# Patient Record
Sex: FEMALE | Race: WHITE | NOT HISPANIC OR LATINO | Employment: FULL TIME | URBAN - METROPOLITAN AREA
[De-identification: names, ages, dates, MRNs, and addresses within clinical notes are randomized per-mention and may not be internally consistent; named-entity substitution may affect disease eponyms.]

---

## 2021-10-11 ENCOUNTER — OFFICE VISIT (OUTPATIENT)
Dept: URGENT CARE | Facility: CLINIC | Age: 21
End: 2021-10-11

## 2021-10-11 VITALS
BODY MASS INDEX: 25.61 KG/M2 | SYSTOLIC BLOOD PRESSURE: 104 MMHG | TEMPERATURE: 98 F | HEART RATE: 78 BPM | WEIGHT: 150 LBS | OXYGEN SATURATION: 99 % | DIASTOLIC BLOOD PRESSURE: 72 MMHG | RESPIRATION RATE: 16 BRPM | HEIGHT: 64 IN

## 2021-10-11 DIAGNOSIS — Z41.3: Primary | ICD-10-CM

## 2021-10-11 PROCEDURE — 99214 PR OFFICE/OUTPT VISIT, EST, LEVL IV, 30-39 MIN: ICD-10-PCS | Mod: TIER,S$GLB,, | Performed by: EMERGENCY MEDICINE

## 2021-10-11 PROCEDURE — 99214 OFFICE O/P EST MOD 30 MIN: CPT | Mod: TIER,S$GLB,, | Performed by: EMERGENCY MEDICINE

## 2021-10-11 RX ORDER — FLUCONAZOLE 150 MG/1
150 TABLET ORAL DAILY
Qty: 2 TABLET | Refills: 0 | Status: SHIPPED | OUTPATIENT
Start: 2021-10-11 | End: 2021-10-13

## 2021-10-11 RX ORDER — CEPHALEXIN 500 MG/1
500 CAPSULE ORAL EVERY 8 HOURS
Qty: 21 CAPSULE | Refills: 0 | Status: SHIPPED | OUTPATIENT
Start: 2021-10-11 | End: 2021-10-18

## 2021-10-14 ENCOUNTER — TELEPHONE (OUTPATIENT)
Dept: URGENT CARE | Facility: CLINIC | Age: 21
End: 2021-10-14

## 2024-01-11 ENCOUNTER — OFFICE VISIT (OUTPATIENT)
Dept: URGENT CARE | Facility: CLINIC | Age: 24
End: 2024-01-11
Payer: COMMERCIAL

## 2024-01-11 VITALS
BODY MASS INDEX: 25.61 KG/M2 | WEIGHT: 150 LBS | RESPIRATION RATE: 16 BRPM | HEIGHT: 64 IN | HEART RATE: 69 BPM | TEMPERATURE: 98 F | SYSTOLIC BLOOD PRESSURE: 100 MMHG | OXYGEN SATURATION: 98 % | DIASTOLIC BLOOD PRESSURE: 70 MMHG

## 2024-01-11 DIAGNOSIS — J06.9 VIRAL UPPER RESPIRATORY INFECTION: Primary | ICD-10-CM

## 2024-01-11 DIAGNOSIS — R05.9 COUGH, UNSPECIFIED TYPE: ICD-10-CM

## 2024-01-11 DIAGNOSIS — R09.81 NASAL CONGESTION: ICD-10-CM

## 2024-01-11 LAB
CTP QC/QA: YES
CTP QC/QA: YES
POC MOLECULAR INFLUENZA A AGN: NEGATIVE
POC MOLECULAR INFLUENZA B AGN: NEGATIVE
SARS-COV-2 AG RESP QL IA.RAPID: NEGATIVE

## 2024-01-11 PROCEDURE — 99213 OFFICE O/P EST LOW 20 MIN: CPT | Mod: S$GLB,,,

## 2024-01-11 PROCEDURE — 87502 INFLUENZA DNA AMP PROBE: CPT | Mod: QW,S$GLB,,

## 2024-01-11 PROCEDURE — 87811 SARS-COV-2 COVID19 W/OPTIC: CPT | Mod: QW,S$GLB,,

## 2024-01-11 NOTE — PATIENT INSTRUCTIONS
PLEASE READ YOUR DISCHARGE INSTRUCTIONS ENTIRELY AS IT CONTAINS IMPORTANT INFORMATION.    Please drink plenty of fluids.    Please get plenty of rest.    Please return here or go to the Emergency Department for any concerns or worsening of condition.    Please take an over the counter antihistamine medication (Allegra/Claritin/Zyrtec) of your choice as directed for allergy symptoms and/or runny nose and postnasal drip.    Try an over the counter decongestant for sinus pressure/ear pressure, congestion symptoms like Mucinex D or Sudafed or Phenylephrine. You buy this behind the pharmacy counter.    If you do have Hypertension or palpitations, it is safe to take Coricidin HBP for relief of sinus symptoms.    Tylenol or ibuprofen can also be used as directed for pain and fever unless you have an allergy to them or medical condition such as stomach ulcers, kidney or liver disease or blood thinners etc for which you should not be taking these type of medications.     Sore throat recommendations: Warm fluids, warm salt water gargles, throat lozenges, tea, honey, soup, rest, hydration.    Use over the counter Flonase or Nasocort: one spray each nostril twice daily OR two sprays each nostril once daily until nares dry out, unless you have Glaucoma.   Can also supplement with nasal saline rinse.    Sinus rinses DO NOT USE TAP WATER, if you must, water must be at a rolling boil for 1 minute, let it cool, then use.  May use distilled water, or over the counter nasal saline rinses.  Heriberto's vapor rub in shower to help open nasal passages.  May use nasal gel to keep passages moisturized.  May use nasal saline sprays during the day for added relief of congestion.   For those who go to the gym, please do not use the sauna or steam room now to clear sinuses.    Cough     Rest and fluids are important  Can use honey with deon to soothe your throat    Robitussin or Delsyum for cough suppressant for dry cough.    Mucinex DM or  products containing Guaifenesin or Dextromethorphan for expectorant (wet cough).    Take prescription cough meds (pills) as prescribed; take prescription cough syrup at night as needed for cough.  Do not take both the prescribed cough pills and syrup at the same time or within 6 hours of each other.  Do not take the cough syrup with any other sedative medication as it can can cause drowsiness. Do not operate any heavy machinery, drink or drive while taking the cough syrup.     Please follow up with your primary care doctor or specialist in the next 48-72hrs as needed and if no improvement    If you smoke, please stop smoking.    Please return or see your primary care doctor if you develop new or worsening symptoms.     Lastly, good hand washing and cough hygiene (cough into your elbow) will help prevent the spread of the illness. A general rule is that you are no longer contagious once you have been without a fever for over 24 hours without requiring fever reducing medications.     Please arrange follow up with your primary medical clinic as soon as possible. You must understand that you've received an Urgent Care treatment only and that you may be released before all of your medical problems are known or treated. You, the patient, will arrange for follow up as instructed. If your symptoms worsen or fail to improve you should go to the Emergency Room.

## 2024-01-11 NOTE — LETTER
January 11, 2024      [facility] [address] [place] [address]  Phone: [phone]  Fax: [phone]       Patient: Tonja Valentin   YOB: 2000  Date of Visit: 01/11/2024    To Whom It May Concern:    Radha Valentin  was at [facility] on 01/11/2024. The patient may return to work/school on 01/15/2024 with no restrictions. If you have any questions or concerns, or if I can be of further assistance, please do not hesitate to contact me.    Sincerely,    Caitlin Weinberg PA-C

## 2024-01-11 NOTE — PROGRESS NOTES
"Subjective:      Patient ID: Tonja Valentin is a 23 y.o. female.    Vitals:  height is 5' 3.5" (1.613 m) and weight is 68 kg (150 lb). Her temperature is 98.2 °F (36.8 °C). Her blood pressure is 100/70 and her pulse is 69. Her respiration is 16 and oxygen saturation is 98%.     Chief Complaint: Fatigue    Tonja Valentin is a 23 y.o. female who presents for URI sxs which onset yesterday. Associated sxs include cough, congestion, fatigue, HA, generalized myalgias, and sore throat. Patient denies any fever, chills, SOB, CP, abd pain, n/v/d, rash, dizziness, or numbness/tingling. Prior Tx includes tylenol and dayquil. (+) flu exposure.    Other  This is a new problem. The current episode started yesterday. The problem occurs constantly. The problem has been gradually worsening. Associated symptoms include congestion (nasal congestion), coughing (dry cough), fatigue, headaches, myalgias and a sore throat (scratchy). Pertinent negatives include no abdominal pain, change in bowel habit, chest pain, chills, diaphoresis, fever, nausea, numbness or vomiting. Associated symptoms comments: Runny nose. Nothing aggravates the symptoms. She has tried acetaminophen (tylenol, Dayquil) for the symptoms. The treatment provided mild relief.       Constitution: Positive for fatigue. Negative for appetite change, chills, sweating and fever.   HENT:  Positive for congestion (nasal congestion) and sore throat (scratchy). Negative for ear pain, ear discharge, hearing loss, postnasal drip, sinus pain, sinus pressure and trouble swallowing.    Cardiovascular:  Negative for chest pain.   Respiratory:  Positive for cough (dry cough). Negative for sputum production and shortness of breath.    Gastrointestinal:  Negative for abdominal pain, nausea, vomiting and diarrhea.   Musculoskeletal:  Positive for muscle ache.   Neurological:  Positive for headaches. Negative for dizziness, numbness and tingling.      Objective:     Physical Exam "   Constitutional: She is oriented to person, place, and time. She appears well-developed. She is cooperative.  Non-toxic appearance. She does not appear ill. No distress.   HENT:   Head: Normocephalic and atraumatic.   Ears:   Right Ear: Hearing, tympanic membrane, external ear and ear canal normal.   Left Ear: Hearing, tympanic membrane, external ear and ear canal normal.   Nose: Nose normal. No mucosal edema or nasal deformity. No epistaxis. Right sinus exhibits no maxillary sinus tenderness and no frontal sinus tenderness. Left sinus exhibits no maxillary sinus tenderness and no frontal sinus tenderness.   Mouth/Throat: Uvula is midline, oropharynx is clear and moist and mucous membranes are normal. Mucous membranes are moist. No trismus in the jaw. Normal dentition. No uvula swelling. No oropharyngeal exudate, posterior oropharyngeal edema or posterior oropharyngeal erythema.   Eyes: Conjunctivae and lids are normal. No scleral icterus. Extraocular movement intact   Neck: Trachea normal and phonation normal. Neck supple. No edema present. No erythema present. No neck rigidity present.   Cardiovascular: Normal rate, regular rhythm, normal heart sounds and normal pulses.   Pulmonary/Chest: Effort normal and breath sounds normal. No stridor. No respiratory distress. She has no decreased breath sounds. She has no wheezes. She has no rhonchi. She has no rales.   Abdominal: Normal appearance.   Musculoskeletal: Normal range of motion.         General: No deformity. Normal range of motion.   Neurological: She is alert and oriented to person, place, and time. She exhibits normal muscle tone. Coordination normal.   Skin: Skin is warm, dry, intact, not diaphoretic and not pale.   Psychiatric: Her speech is normal and behavior is normal. Judgment and thought content normal.   Nursing note and vitals reviewed.      Assessment:     1. Viral upper respiratory infection    2. Nasal congestion    3. Cough, unspecified type         Results for orders placed or performed in visit on 01/11/24   POCT Influenza A/B MOLECULAR   Result Value Ref Range    POC Molecular Influenza A Ag Negative Negative, Not Reported    POC Molecular Influenza B Ag Negative Negative, Not Reported     Acceptable Yes    SARS Coronavirus 2 Antigen, POCT Manual Read   Result Value Ref Range    SARS Coronavirus 2 Antigen Negative Negative     Acceptable Yes        Plan:       Viral upper respiratory infection    Nasal congestion  -     POCT Influenza A/B MOLECULAR  -     SARS Coronavirus 2 Antigen, POCT Manual Read    Cough, unspecified type  -     POCT Influenza A/B MOLECULAR  -     SARS Coronavirus 2 Antigen, POCT Manual Read      Afebrile. VSS. Patient is in NAD.  Discussed negative results with patient.  Educated patient on viral vs bacterial sinus infection/upper respiratory infection.   Increase fluid intake and plenty of rest.  Tylenol/Ibuprofen (as permitted) as needed for any pain or discomfort.  If symptoms do not resolve, return to clinic for further evaluation.  ER precautions given such as SOB, CP, or fever not resolved with fever-reducing medications.

## 2025-06-07 ENCOUNTER — OFFICE VISIT (OUTPATIENT)
Dept: URGENT CARE | Facility: CLINIC | Age: 25
End: 2025-06-07
Payer: COMMERCIAL

## 2025-06-07 VITALS
DIASTOLIC BLOOD PRESSURE: 63 MMHG | RESPIRATION RATE: 16 BRPM | HEIGHT: 64 IN | OXYGEN SATURATION: 95 % | BODY MASS INDEX: 22.94 KG/M2 | HEART RATE: 73 BPM | WEIGHT: 134.38 LBS | SYSTOLIC BLOOD PRESSURE: 94 MMHG | TEMPERATURE: 98 F

## 2025-06-07 DIAGNOSIS — N76.0 ACUTE VAGINITIS: Primary | ICD-10-CM

## 2025-06-07 DIAGNOSIS — N89.8 VAGINAL IRRITATION: ICD-10-CM

## 2025-06-07 DIAGNOSIS — N89.8 VAGINAL DISCHARGE: ICD-10-CM

## 2025-06-07 PROCEDURE — 81515 NFCT DS BV&VAGINITIS DNA ALG: CPT

## 2025-06-07 PROCEDURE — 99213 OFFICE O/P EST LOW 20 MIN: CPT | Mod: S$GLB,,,

## 2025-06-07 RX ORDER — METRONIDAZOLE 500 MG/1
500 TABLET ORAL EVERY 12 HOURS
Qty: 14 TABLET | Refills: 0 | Status: SHIPPED | OUTPATIENT
Start: 2025-06-07 | End: 2025-06-14

## 2025-06-07 NOTE — PROGRESS NOTES
"Subjective:      Patient ID: Tonja Valentin is a 24 y.o. female.    Vitals:  height is 5' 4" (1.626 m) and weight is 61 kg (134 lb 5.9 oz). Her oral temperature is 97.8 °F (36.6 °C). Her blood pressure is 94/63 and her pulse is 73. Her respiration is 16 and oxygen saturation is 95%.     Chief Complaint: Other Misc (Entered by patient) and Vaginal Itching    Tonja Valentin is a 24 y.o. female who presents for vaginal itching which onset 1 week ago. Associated sxs include vaginal discharge and odor. Notes a malodorous thick discharge. Patient denies any fever, chills, abd pain, flank pain, hematuria, dysuria, and decreased urination. No known exposures. No treatments tried. Notes recent placement of IUD.       Vaginal Itching  The patient's primary symptoms include genital itching, a genital odor and vaginal discharge. The patient's pertinent negatives include no genital lesions, genital rash, missed menses, pelvic pain or vaginal bleeding. This is a new problem. The current episode started 1 to 4 weeks ago (Onset about a week ago). The problem occurs constantly. The problem has been gradually worsening. The patient is experiencing no pain. She is not pregnant. Pertinent negatives include no abdominal pain, back pain, chills, constipation, diarrhea, discolored urine, dysuria, fever, flank pain, frequency, hematuria, nausea, painful intercourse, rash, urgency or vomiting. The vaginal discharge was thick, malodorous and clear. There has been no bleeding. She has not been passing clots. She has not been passing tissue. The treatment provided no relief. She is sexually active. No, her partner does not have an STD. She uses an IUD for contraception.       Constitution: Negative for chills, fatigue and fever.   Gastrointestinal:  Negative for abdominal pain, nausea, vomiting, constipation and diarrhea.   Genitourinary:  Positive for vaginal discharge and vaginal odor. Negative for dysuria, frequency, urgency, urine " decreased, flank pain, bladder incontinence, hematuria, history of kidney stones, missed menses, genital sore and pelvic pain.   Musculoskeletal:  Negative for back pain.   Skin:  Negative for rash.   Neurological:  Negative for numbness and tingling.      Objective:     Physical Exam   Constitutional: She is oriented to person, place, and time. She appears well-developed.   HENT:   Head: Normocephalic and atraumatic.   Ears:   Right Ear: External ear normal.   Left Ear: External ear normal.   Nose: Nose normal. No nasal deformity. No epistaxis.   Mouth/Throat: Oropharynx is clear and moist and mucous membranes are normal.   Eyes: Lids are normal.   Neck: Trachea normal and phonation normal. Neck supple.   Cardiovascular: Normal pulses.   Pulmonary/Chest: Effort normal.   Abdominal: Normal appearance and bowel sounds are normal. She exhibits no distension. Soft. There is no abdominal tenderness.   Genitourinary:         Comments: Deferred.     Neurological: She is alert and oriented to person, place, and time.   Skin: Skin is warm, dry and intact.   Psychiatric: Her speech is normal and behavior is normal.   Nursing note and vitals reviewed.      Assessment:     1. Acute vaginitis    2. Vaginal irritation    3. Vaginal discharge        Plan:       Acute vaginitis  -     metroNIDAZOLE (FLAGYL) 500 MG tablet; Take 1 tablet (500 mg total) by mouth every 12 (twelve) hours. for 7 days  Dispense: 14 tablet; Refill: 0    Vaginal irritation  -     Vaginosis Screen by DNA Probe    Vaginal discharge  -     Vaginosis Screen by DNA Probe      Afebrile. VSS.   deferred.  Acute vaginitis. Symptoms consistent with BV. Will treat presumptively based on symptoms.   Vaginosis screen ordered. Will notify patient with results in 3-5 days and adjust tx as necessary. Patient declined STD testing.   Meds: flagyl sent to preferred pharmacy.  Discussed proper hygiene practices.  Increase fluid intake.  RTC for any worsening or no  improvement of symptoms.

## 2025-06-09 ENCOUNTER — PATIENT MESSAGE (OUTPATIENT)
Dept: URGENT CARE | Facility: CLINIC | Age: 25
End: 2025-06-09
Payer: COMMERCIAL

## 2025-06-09 ENCOUNTER — RESULTS FOLLOW-UP (OUTPATIENT)
Dept: URGENT CARE | Facility: CLINIC | Age: 25
End: 2025-06-09

## 2025-06-09 DIAGNOSIS — B37.31 VAGINAL YEAST INFECTION: Primary | ICD-10-CM

## 2025-06-09 LAB
BACTERIAL VAGINOSIS DNA (OHS): DETECTED
CANDIDA GLABRATA/KRUSEI DNA (OHS): NOT DETECTED
CANDIDA SPECIES DNA (OHS): DETECTED
TRICHOMONAS VAGINALIS DNA (OHS): NOT DETECTED

## 2025-06-09 RX ORDER — FLUCONAZOLE 150 MG/1
150 TABLET ORAL ONCE
Qty: 2 TABLET | Refills: 0 | Status: SHIPPED | OUTPATIENT
Start: 2025-06-09 | End: 2025-06-09